# Patient Record
Sex: FEMALE | Race: WHITE | NOT HISPANIC OR LATINO | Employment: STUDENT | URBAN - METROPOLITAN AREA
[De-identification: names, ages, dates, MRNs, and addresses within clinical notes are randomized per-mention and may not be internally consistent; named-entity substitution may affect disease eponyms.]

---
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
## 2023-03-18 ENCOUNTER — HOSPITAL ENCOUNTER (EMERGENCY)
Facility: CLINIC | Age: 18
Discharge: HOME | End: 2023-03-18
Attending: EMERGENCY MEDICINE | Admitting: EMERGENCY MEDICINE
Payer: COMMERCIAL

## 2023-03-18 VITALS
RESPIRATION RATE: 20 BRPM | OXYGEN SATURATION: 99 % | DIASTOLIC BLOOD PRESSURE: 69 MMHG | HEART RATE: 82 BPM | SYSTOLIC BLOOD PRESSURE: 116 MMHG | TEMPERATURE: 97 F

## 2023-03-18 DIAGNOSIS — F10.920 ALCOHOLIC INTOXICATION WITHOUT COMPLICATION (H): ICD-10-CM

## 2023-03-18 LAB — ALCOHOL BREATH TEST: 1.72 (ref 0–0.01)

## 2023-03-18 PROCEDURE — 99282 EMERGENCY DEPT VISIT SF MDM: CPT | Performed by: EMERGENCY MEDICINE

## 2023-03-18 PROCEDURE — 82075 ASSAY OF BREATH ETHANOL: CPT | Performed by: EMERGENCY MEDICINE

## 2023-03-18 PROCEDURE — 99283 EMERGENCY DEPT VISIT LOW MDM: CPT | Performed by: EMERGENCY MEDICINE

## 2023-03-18 ASSESSMENT — ACTIVITIES OF DAILY LIVING (ADL)
ADLS_ACUITY_SCORE: 35
ADLS_ACUITY_SCORE: 35

## 2023-03-18 NOTE — DISCHARGE INSTRUCTIONS
Return to the emergency department if you are unable to eat, drink or if you have any further concerns.

## 2023-03-18 NOTE — ED TRIAGE NOTES
Pt brought in per EMS for concerns of alcohol intoxication. Pt was found vomiting in an apartment lobby and unsure of where she was. Pt is able to state time date and . Pt is a/o     Triage Assessment     Row Name [id number]       Triage Assessment (Adult)    Airway WDL WDL       Respiratory WDL    Respiratory WDL WDL       Skin Circulation/Temperature WDL    Skin Circulation/Temperature WDL WDL       Cardiac WDL    Cardiac WDL WDL       Peripheral/Neurovascular WDL    Peripheral Neurovascular WDL WDL       Cognitive/Neuro/Behavioral WDL    Cognitive/Neuro/Behavioral WDL WDL

## 2023-03-18 NOTE — ED PROVIDER NOTES
ED Provider Note  [facility]      History     Chief Complaint   Patient presents with     Alcohol Intoxication     HPI  Taylor Silverio is a 17 year old female who is presenting with alcohol intoxication.  She does not have her keys or waiting in the room.  She did vomit with the medics.  She denies nausea at this point.  She denies hitting her head or any pain at this point.  She knows where she is.  Denies other drug use or thoughts of harming her self.  She has no further complaints.    Past Medical History  No past medical history on file.  No past surgical history on file.  No current outpatient medications on file.    Allergies   Allergen Reactions     Pineapple Hives     Family History  No family history on file.  Social History       Past medical history, past surgical history, medications, allergies, family history, and social history were reviewed with the patient. No additional pertinent items.      A medically appropriate review of systems was performed with pertinent positives and negatives noted in the HPI, and all other systems negative.    Physical Exam   BP: 116/69  Pulse: 82  Temp: 97  F (36.1  C)  Resp: 20  SpO2: 99 %  Physical Exam  Physical Exam   Constitutional: oriented to person, place, and time. appears well-developed and well-nourished.   HENT:   Head: Normocephalic and atraumatic.   Neck: Normal range of motion. No nuchal rigidity.  Pulmonary/Chest: Effort normal. No respiratory distress.   Cardiac: No murmurs, rubs, gallops. RRR.  Abdominal: Abdomen soft, nontender, nondistended. No rebound tenderness.  MSK: Long bones without deformity or evidence of trauma  Neurological: alert and oriented to person, place, and time. Stable gait.  No focal defects.  Pupils 2 mm and reactive to light bilaterally.  No nystagmus.  Skin: Skin is warm and dry.   Psychiatric:  normal mood and affect.  behavior is normal. Thought content normal.       ED Course, Procedures, & Data       Procedures              No results found for any visits on 03/18/23.  Medications - No data to display  Labs Ordered and Resulted from Time of ED Arrival to Time of ED Departure - No data to display  No orders to display          Critical care was not performed.     Medical Decision Making  The patient's presentation was of low complexity (an acute and uncomplicated illness or injury).    The patient's evaluation involved:  history and exam without other [facility] data elements    The patient's management necessitated only low risk treatment.      Assessment & Plan    [facility]   patient presenting with alcohol intoxication.  Here she is clinically sober at this point.  She is alert and oriented x3.  She is not vomiting and tolerating p.o. intake.  She has no physical discomfort, appears quite well, vitals are stable.  She has a sober ride.  She can be watched overnight by the sober ride.  No thoughts of self-harm or further concerns.  Patient will be discharged with a sober ride.  She knows she can return if she has any further concerns.    I have reviewed the nursing notes. I have reviewed the findings, diagnosis, plan and need for follow up with the patient.    New Prescriptions    No medications on file       Final diagnoses:   Alcoholic intoxication without complication (H)       Pedro Nava  [facility] EMERGENCY DEPARTMENT  3/18/2023     Pedro Nava MD  03/18/23 0226

## 2023-05-09 ENCOUNTER — HOSPITAL ENCOUNTER (OUTPATIENT)
Dept: RESEARCH | Facility: CLINIC | Age: 18
Discharge: HOME | End: 2023-05-09
Attending: INTERNAL MEDICINE
Payer: COMMERCIAL

## 2023-05-09 PROCEDURE — 300N000003 HC RESEARCH SPECIMEN PROCESSING, SIMPLE

## 2023-05-09 PROCEDURE — 510N000009 HC RESEARCH FACILITY, PER 15 MIN

## 2023-05-09 PROCEDURE — 510N000017 HC CRU PATIENT CARE, PER 15 MIN

## 2023-05-09 NOTE — ADDENDUM NOTE
Encounter addended by: Mildred Christopher on: 5/9/2023 3:15 PM   Actions taken: Charge Capture section accepted

## 2023-06-04 ENCOUNTER — HEALTH MAINTENANCE LETTER (OUTPATIENT)
Age: 18
End: 2023-06-04

## 2024-07-27 ENCOUNTER — HEALTH MAINTENANCE LETTER (OUTPATIENT)
Age: 19
End: 2024-07-27

## 2025-08-10 ENCOUNTER — HEALTH MAINTENANCE LETTER (OUTPATIENT)
Age: 20
End: 2025-08-10